# Patient Record
Sex: FEMALE | Race: WHITE
[De-identification: names, ages, dates, MRNs, and addresses within clinical notes are randomized per-mention and may not be internally consistent; named-entity substitution may affect disease eponyms.]

---

## 2019-07-10 ENCOUNTER — HOSPITAL ENCOUNTER (OUTPATIENT)
Dept: HOSPITAL 92 - ERS | Age: 54
Setting detail: OBSERVATION
LOS: 3 days | Discharge: HOME | End: 2019-07-13
Attending: HOSPITALIST | Admitting: HOSPITALIST
Payer: COMMERCIAL

## 2019-07-10 VITALS — BODY MASS INDEX: 38.6 KG/M2

## 2019-07-10 DIAGNOSIS — Z98.84: ICD-10-CM

## 2019-07-10 DIAGNOSIS — Z79.899: ICD-10-CM

## 2019-07-10 DIAGNOSIS — E66.9: ICD-10-CM

## 2019-07-10 DIAGNOSIS — E11.9: ICD-10-CM

## 2019-07-10 DIAGNOSIS — F32.9: ICD-10-CM

## 2019-07-10 DIAGNOSIS — Z79.4: ICD-10-CM

## 2019-07-10 DIAGNOSIS — K80.10: Primary | ICD-10-CM

## 2019-07-10 DIAGNOSIS — I10: ICD-10-CM

## 2019-07-10 DIAGNOSIS — R74.8: ICD-10-CM

## 2019-07-10 LAB
ALBUMIN SERPL BCG-MCNC: 4.1 G/DL (ref 3.5–5)
ALP SERPL-CCNC: 108 U/L (ref 40–150)
ALT SERPL W P-5'-P-CCNC: 89 U/L (ref 8–55)
ANION GAP SERPL CALC-SCNC: 15 MMOL/L (ref 10–20)
AST SERPL-CCNC: 214 U/L (ref 5–34)
BACTERIA UR QL AUTO: (no result) HPF
BASOPHILS # BLD AUTO: 0 THOU/UL (ref 0–0.2)
BASOPHILS NFR BLD AUTO: 0.3 % (ref 0–1)
BILIRUB SERPL-MCNC: 0.8 MG/DL (ref 0.2–1.2)
BUN SERPL-MCNC: 32 MG/DL (ref 9.8–20.1)
CALCIUM SERPL-MCNC: 10 MG/DL (ref 7.8–10.44)
CHLORIDE SERPL-SCNC: 105 MMOL/L (ref 98–107)
CK SERPL-CCNC: 94 U/L (ref 29–168)
CO2 SERPL-SCNC: 23 MMOL/L (ref 22–29)
CREAT CL PREDICTED SERPL C-G-VRATE: 0 ML/MIN (ref 70–130)
EOSINOPHIL # BLD AUTO: 0 THOU/UL (ref 0–0.7)
EOSINOPHIL NFR BLD AUTO: 0.2 % (ref 0–10)
GLOBULIN SER CALC-MCNC: 3.2 G/DL (ref 2.4–3.5)
GLUCOSE SERPL-MCNC: 304 MG/DL (ref 70–105)
HGB BLD-MCNC: 11.6 G/DL (ref 12–16)
LYMPHOCYTES # BLD: 0.8 THOU/UL (ref 1.2–3.4)
LYMPHOCYTES NFR BLD AUTO: 8.3 % (ref 21–51)
MCH RBC QN AUTO: 26 PG (ref 27–31)
MCV RBC AUTO: 83.1 FL (ref 78–98)
MDIFF COMPLETE?: YES
MONOCYTES # BLD AUTO: 0.3 THOU/UL (ref 0.11–0.59)
MONOCYTES NFR BLD AUTO: 3.3 % (ref 0–10)
NEUTROPHILS # BLD AUTO: 8.3 THOU/UL (ref 1.4–6.5)
NEUTROPHILS NFR BLD AUTO: 87.9 % (ref 42–75)
OVALOCYTES BLD QL SMEAR: (no result) (100X)
PLATELET # BLD AUTO: 166 THOU/UL (ref 130–400)
POLYCHROMASIA BLD QL SMEAR: (no result) (100X)
POTASSIUM SERPL-SCNC: 4.5 MMOL/L (ref 3.5–5.1)
PROT UR STRIP.AUTO-MCNC: 20 MG/DL
RBC # BLD AUTO: 4.46 MILL/UL (ref 4.2–5.4)
RBC UR QL AUTO: (no result) HPF (ref 0–3)
SODIUM SERPL-SCNC: 138 MMOL/L (ref 136–145)
TROPONIN I SERPL DL<=0.01 NG/ML-MCNC: (no result) NG/ML (ref ?–0.03)
TROPONIN I SERPL DL<=0.01 NG/ML-MCNC: (no result) NG/ML (ref ?–0.03)
WBC # BLD AUTO: 9.5 THOU/UL (ref 4.8–10.8)
WBC UR QL AUTO: (no result) HPF (ref 0–3)

## 2019-07-10 PROCEDURE — 47532 INJECTION FOR CHOLANGIOGRAM: CPT

## 2019-07-10 PROCEDURE — 80053 COMPREHEN METABOLIC PANEL: CPT

## 2019-07-10 PROCEDURE — 76705 ECHO EXAM OF ABDOMEN: CPT

## 2019-07-10 PROCEDURE — 88304 TISSUE EXAM BY PATHOLOGIST: CPT

## 2019-07-10 PROCEDURE — 96372 THER/PROPH/DIAG INJ SC/IM: CPT

## 2019-07-10 PROCEDURE — 82550 ASSAY OF CK (CPK): CPT

## 2019-07-10 PROCEDURE — 74018 RADEX ABDOMEN 1 VIEW: CPT

## 2019-07-10 PROCEDURE — 83690 ASSAY OF LIPASE: CPT

## 2019-07-10 PROCEDURE — 36416 COLLJ CAPILLARY BLOOD SPEC: CPT

## 2019-07-10 PROCEDURE — 36415 COLL VENOUS BLD VENIPUNCTURE: CPT

## 2019-07-10 PROCEDURE — A9500 TC99M SESTAMIBI: HCPCS

## 2019-07-10 PROCEDURE — 96361 HYDRATE IV INFUSION ADD-ON: CPT

## 2019-07-10 PROCEDURE — 93010 ELECTROCARDIOGRAM REPORT: CPT

## 2019-07-10 PROCEDURE — 85025 COMPLETE CBC W/AUTO DIFF WBC: CPT

## 2019-07-10 PROCEDURE — 85730 THROMBOPLASTIN TIME PARTIAL: CPT

## 2019-07-10 PROCEDURE — 96374 THER/PROPH/DIAG INJ IV PUSH: CPT

## 2019-07-10 PROCEDURE — 71045 X-RAY EXAM CHEST 1 VIEW: CPT

## 2019-07-10 PROCEDURE — G0378 HOSPITAL OBSERVATION PER HR: HCPCS

## 2019-07-10 PROCEDURE — 78452 HT MUSCLE IMAGE SPECT MULT: CPT

## 2019-07-10 PROCEDURE — C9113 INJ PANTOPRAZOLE SODIUM, VIA: HCPCS

## 2019-07-10 PROCEDURE — 83880 ASSAY OF NATRIURETIC PEPTIDE: CPT

## 2019-07-10 PROCEDURE — 93017 CV STRESS TEST TRACING ONLY: CPT

## 2019-07-10 PROCEDURE — 81001 URINALYSIS AUTO W/SCOPE: CPT

## 2019-07-10 PROCEDURE — 93005 ELECTROCARDIOGRAM TRACING: CPT

## 2019-07-10 PROCEDURE — 85379 FIBRIN DEGRADATION QUANT: CPT

## 2019-07-10 PROCEDURE — 96376 TX/PRO/DX INJ SAME DRUG ADON: CPT

## 2019-07-10 PROCEDURE — S0028 INJECTION, FAMOTIDINE, 20 MG: HCPCS

## 2019-07-10 PROCEDURE — 85610 PROTHROMBIN TIME: CPT

## 2019-07-10 PROCEDURE — 96375 TX/PRO/DX INJ NEW DRUG ADDON: CPT

## 2019-07-10 PROCEDURE — 84484 ASSAY OF TROPONIN QUANT: CPT

## 2019-07-10 PROCEDURE — 96365 THER/PROPH/DIAG IV INF INIT: CPT

## 2019-07-10 NOTE — RAD
SUPINE ABDOMEN:

7/10/19

 

HISTORY: 

Abdominal pain, constipation. 

 

Bowel gas patter unremarkable. Scattered stool and gas throughout the colon. Post cholecystectomy cli
ps. Small bowel gas patter is unremarkable. No mass effect. [place] shaped density overlying the left 
colon may represent ingested tablet. 

 

IMPRESSION: 

Unremarkable bowel gas pattern. 

 

POS: [facility]

## 2019-07-10 NOTE — RAD
Chest one view



HISTORY: Chest pain.



FINDINGS: No comparison. Cardiac silhouette is magnified by projection. Pulmonary vasculature is unre
markable. Mediastinum is midline. No confluent airspace consolidation or evidence of pneumothorax.







IMPRESSION: No active cardiopulmonary abnormalities are demonstrated.



Reported By: DAVID Ramirez 

Electronically Signed:  7/10/2019 7:41 AM

## 2019-07-10 NOTE — HP
CHIEF COMPLAINT:  Chest pain.



HISTORY OF PRESENT ILLNESS:  Ms. Chavez is a very pleasant 53-year-old woman, who

presents with complaints of chest pain that woke her from her sleep at approximately

2:00 this morning.  The patient describes a pain in the lower sternal/epigastric

region and states it felt like a pressure similar to what she has experienced in the

past.  The patient has a history of a gastric sleeve and feels she has had episodes

where food gets stuck and her pain is unrelieved with vomiting.  The gastric sleeve

was placed 7 years ago.  The patient states the discomfort she felt today was

different in that she did not vomit.  She states the discomfort wrapped around both

sides of her abdomen and she found it difficult to take a deep breath due to the

pressure felt in her upper abdomen.  She did not try taking anything for this at

home and due to concerns for cardiac problem, she opted to come in to the emergency

department.  The patient states in the last 2 to 3 months, she has made changes with

her diet and has become more conscious of maintaining a low sugar, low-carbohydrate

diet as she used to do after initially undergoing the gastric sleeve.  The patient

states since then, she has noted changes with her glucose, which have many times

been in the 50s.  She also reports noting a drop in her blood pressure, which has

been in the 90s systolic.  The patient sought to obtain followup with her primary

care physician for both of these issues.  She was advised to adjust her diabetes

medications on her own until due for the 3-month hemoglobin A1c check on .

The patient therefore opted to discontinue her Lantus and drop her

glyburide/metformin 5/500 mg dose to one tablet daily instead of 2 tablets daily.

She has taken her insulin on occasion if her glucose has been in the 200s due to

what she is eating.  Otherwise, she has been very strict with her diet. 



The patient denies experiencing any diaphoresis with the chest pain.  Denies having

any shortness of breath or cough.  No hemoptysis.  Denies having any fevers.  She

does report having chills down in the emergency department.  Reports having issues

with constipation.  Denies having any dysuria or hematuria.  All other review of

systems are negative. 



PAST MEDICAL HISTORY:  

1. Diabetes mellitus.

2. Hypertension.

3. Obesity.

4. Depression.



PAST SURGICAL HISTORY:  

1. .

2. Hand surgery.

3. Gastric sleeve in 2012.



SOCIAL HISTORY:  The patient denies any alcohol intake.  Denies any drug use or

tobacco use, though she did smoke until 1 year ago. 



ALLERGIES:  NO KNOWN DRUG ALLERGIES.



CURRENT MEDICATIONS:  

1. Lantus.

2. Lisinopril.

3. Simvastatin.

4. Glyburide/metformin.

5. Oxybutynin.

6. Prozac.



PHYSICAL EXAMINATION:

GENERAL:  The patient appears overweight, well developed, in no acute distress. 

VITAL SIGNS:  Temperature 97.4, pulse 63, blood pressure 101/68, respirations 16, O2

saturation 97% on room air. 

HEENT:  Normocephalic and atraumatic.  Pupils are equal, round, reactive to light.

Sclerae icterus.  Oropharynx is clear. 

NECK:  Supple. 

LUNGS:  Clear to auscultation bilaterally. 

CARDIAC:  Regular rate and rhythm.  No chest wall tenderness. 

ABDOMEN:  Soft, nontender, nondistended.  Normoactive bowel sounds present.  No

Diaz sign.  No renal angle tenderness.  No guarding or rigidity. 

EXTREMITIES:  Without lower leg swelling or edema. 

NEUROLOGIC:  Alert and oriented x3. 

SKIN:  Without rash or jaundice.



LABORATORY STUDIES:  EKG showed normal sinus rhythm without any ST changes or T-wave

abnormalities. 



White count 9.5, hemoglobin 11.6, hematocrit 37, platelets 166.  D-dimer 0.36.

Electrolytes unremarkable.  BUN 32, creatinine 1.06, GFR 54.  No previous renal

functions to compare to.  Glucose 304, calcium 10, total bilirubin 0.8, AST elevated

at 214 and ALT 89.  Lipase 49.  Troponin negative x3.  CK 94, and alkaline

phosphatase 108. 



IMAGING DATA:  Chest x-ray showed no active cardiopulmonary abnormalities.



IMPRESSION AND PLAN:  Ms. Chavez is a 53-year-old woman, who presented with lower

sternal/epigastric pain, referred for management of the following. 

1. Acute coronary syndrome rule out.  Troponins negative x3.  An EKG without any

changes.  Pain appears to be more abdominal in nature, likely associated with her

gastric sleeve or gallbladder problem given the elevated LFTs.  No pain at present.

Unlikely to be cardiac in nature, but we will continue to trend troponins and

monitor.  We will obtain a stress test given her risk factors.  We have also

requested a gallbladder ultrasound and abdominal x-ray.  We will continue to trend

her LFTs. 

2. Constipation.  We will manage with stool softeners.

3. Hypertension.  We will resume her home medications, but we will monitor her blood

pressures and administer accordingly. 

4. Diabetes mellitus.  We will reconcile her home medications except Lantus.  We

will initiate insulin sliding scale.  Metformin and glyburide will be held for now

given stress test. 

5. Gastrointestinal prophylaxis with Protonix.

6. Deep venous thrombosis prophylaxis with mechanical SCDs.

7. Code status full.  Her surrogate decision maker is her , Kahlil Walton.

8. The patient's case was discussed with Dr. Darden, who agrees with plan of care

as described above. 







Job ID:  [id number]

## 2019-07-10 NOTE — ULT
RIGHT UPPER QUADRANT ABDOMINAL ULTRASOUND:

7/10/19

 

COMPARISON: 

None.

 

HISTORY: 

Right sided abdominal pain and elevated LFTs. 

 

TECHNIQUE:  

Multiplanar gray scale and color Doppler images were obtained in a right upper quadrant abdominal ult
rasound. 

 

FINDINGS: 

The liver is normal in echogenicity without focal lesions or intrahepatic ductal dilatation. Multiple
 shadowing stones are seen in the gallbladder. There is no gallbladder wall thickening or pericholecy
stic fluid. The common bile duct is normal measuring 1 to 2 mm. 

 

The visualized portions of the pancreas are unremarkable.  The right kidney is normal in echogenicity
 without hydronephrosis or calculus and measures  10.2 cm in length. 

 

IMPRESSION: 

Cholelithiasis. 

 

POS: [facility]

## 2019-07-11 LAB
ALBUMIN SERPL BCG-MCNC: 3.6 G/DL (ref 3.5–5)
ALP SERPL-CCNC: 177 U/L (ref 40–150)
ALT SERPL W P-5'-P-CCNC: 459 U/L (ref 8–55)
ANION GAP SERPL CALC-SCNC: 9 MMOL/L (ref 10–20)
AST SERPL-CCNC: 456 U/L (ref 5–34)
BASOPHILS # BLD AUTO: 0 THOU/UL (ref 0–0.2)
BASOPHILS NFR BLD AUTO: 0.2 % (ref 0–1)
BILIRUB SERPL-MCNC: 1.6 MG/DL (ref 0.2–1.2)
BUN SERPL-MCNC: 21 MG/DL (ref 9.8–20.1)
CALCIUM SERPL-MCNC: 9.6 MG/DL (ref 7.8–10.44)
CHLORIDE SERPL-SCNC: 108 MMOL/L (ref 98–107)
CO2 SERPL-SCNC: 28 MMOL/L (ref 22–29)
CREAT CL PREDICTED SERPL C-G-VRATE: 103 ML/MIN (ref 70–130)
EOSINOPHIL # BLD AUTO: 0.2 THOU/UL (ref 0–0.7)
EOSINOPHIL NFR BLD AUTO: 3.2 % (ref 0–10)
GLOBULIN SER CALC-MCNC: 3 G/DL (ref 2.4–3.5)
GLUCOSE SERPL-MCNC: 175 MG/DL (ref 70–105)
HGB BLD-MCNC: 10.8 G/DL (ref 12–16)
LIPASE SERPL-CCNC: 27 U/L (ref 8–78)
LYMPHOCYTES # BLD: 1.3 THOU/UL (ref 1.2–3.4)
LYMPHOCYTES NFR BLD AUTO: 20.4 % (ref 21–51)
MCH RBC QN AUTO: 26.4 PG (ref 27–31)
MCV RBC AUTO: 84.4 FL (ref 78–98)
MONOCYTES # BLD AUTO: 0.5 THOU/UL (ref 0.11–0.59)
MONOCYTES NFR BLD AUTO: 7.3 % (ref 0–10)
NEUTROPHILS # BLD AUTO: 4.5 THOU/UL (ref 1.4–6.5)
NEUTROPHILS NFR BLD AUTO: 68.9 % (ref 42–75)
PLATELET # BLD AUTO: 153 THOU/UL (ref 130–400)
POTASSIUM SERPL-SCNC: 4.1 MMOL/L (ref 3.5–5.1)
RBC # BLD AUTO: 4.11 MILL/UL (ref 4.2–5.4)
SODIUM SERPL-SCNC: 141 MMOL/L (ref 136–145)
WBC # BLD AUTO: 6.6 THOU/UL (ref 4.8–10.8)

## 2019-07-11 RX ADMIN — INSULIN LISPRO PRN UNIT: 100 INJECTION, SOLUTION INTRAVENOUS; SUBCUTANEOUS at 17:43

## 2019-07-11 NOTE — PDOC.PN
- Subjective


Encounter Start Date: 07/11/19


Encounter Start Time: 07:57


Subjective: Patient states she feels well and without any complaints.


-: No further abdominal pain, n/v. has not had anything to eat


-: due to stress test scheduled for today.  No fevers, chills or sweats. 


Denies any chest pain or sob. No lightheadedness/dizziness. 





- Objective


Resuscitation Status - Order Detail:





07/10/19 13:18


Resuscitation Status Routine 


   Co-Sign Provider: 


   Resuscitation Status: FULL: Full Resuscitation








Vital Signs & Weight: 


 Vital Signs (12 hours)











  Temp Pulse Resp BP BP Pulse Ox


 


 07/11/19 11:38  97.9 F  60  15   143/64 H  99


 


 07/11/19 08:00  97.9 F  68  16   130/61  96


 


 07/11/19 05:15   69  18  115/54 L   95








 Weight











Weight                         225 lb














I&O: 


 











 07/10/19 07/11/19 07/12/19





 06:59 06:59 06:59


 


Intake Total  240 


 


Output Total  100 


 


Balance  140 











Result Diagrams: 


 07/11/19 05:43





 07/11/19 05:43


Additional Labs: 


 Accuchecks











  07/10/19 07/10/19





  20:45 15:58


 


POC Glucose  201 H  221 H














Phys Exam





- Physical Examination


Constitutional: NAD


HEENT: PERRLA, moist MMs, oral pharynx no lesions


Neck: no nodes, supple, full ROM


Respiratory: clear to auscultation bilateral


Cardiovascular: RRR


Gastrointestinal: soft, non-tender, no distention, positive bowel sounds


Musculoskeletal: no edema, pulses present


Neurological: normal sensation, moves all 4 limbs


Psychiatric: normal affect, A&O x 3


Skin: no rash





Dx/Plan


(1) Elevated liver enzymes


Code(s): R74.8 - ABNORMAL LEVELS OF OTHER SERUM ENZYMES   Status: Acute   





(2) Cholelithiasis


Code(s): K80.20 - CALCULUS OF GALLBLADDER W/O CHOLECYSTITIS W/O OBSTRUCTION   

Status: Acute   





(3) Diabetes


Code(s): E11.9 - TYPE 2 DIABETES MELLITUS WITHOUT COMPLICATIONS   Status: 

Chronic   


Qualifiers: 


   Diabetes mellitus type: type 2 





(4) Hypertension


Code(s): I10 - ESSENTIAL (PRIMARY) HYPERTENSION   Status: Chronic   





- Plan


cont current plan of care, DVT proph w/SCDs


LFTs further elevated and now with hyperbilirubinemia.


-: Gallstones confirmed on GB US yesterday but at that time no obstruction.


-: Consult placed to Dr. Penaloza (Her surgeon who performed her gastric sleeve).


-: Patient for stress test, remain NPO after stress test, til surgical review





* .

## 2019-07-11 NOTE — CON
DATE OF CONSULTATION:  



CHIEF COMPLAINT:  Severe epigastric pain.



HISTORY OF PRESENT ILLNESS:  A 53-year-old female, who was admitted to the hospital

with an episode of severe epigastric pain associated with nausea, radiating to the

back.  She has had several smaller episodes of this.  Ultrasound shows

cholelithiasis. 



PAST MEDICAL HISTORY:  Diabetes, hypertension, obesity, depression.



PAST SURGICAL HISTORY:   section, hand surgery, sleeve gastrectomy in 2012.



MEDICATIONS:  Lantus, lisinopril, simvastatin, glyburide, oxybutynin, and Prozac.



ALLERGIES:  NO KNOWN DRUG ALLERGIES.



SOCIAL HISTORY:  No tobacco or alcohol.



PHYSICAL EXAMINATION:

VITAL SIGNS:  Temperature 97.9, pulse 60, blood pressure 143/64. 

GENERAL:  Obese female, in no apparent distress. 

HEENT:  No jaundice. 

LUNGS:  Clear. 

HEART:  Regular rate and rhythm. 

ABDOMEN:  Obese, soft.  Mild tenderness in mid epigastrium and right upper quadrant. 

EXTREMITIES:  Unremarkable.



LABORATORY DATA:  White count 6.6, H and H 10 and 34, platelet count 153.  She has

an elevated bilirubin at 1.6, AST at 456, ALT at 459, alkaline phosphatase 177,

lipase is 27. 



ASSESSMENT:  Severe biliary colic with elevated liver functions.



PLAN:  Laparoscopic cholecystectomy with cholangiogram.



CONSENT:  Discussed planned procedure as well as risk of bleeding, infection, injury

to bowel and bladder, need to open.  She understands and gives informed consent. 







Job ID:  [id number]

## 2019-07-11 NOTE — NM
MYOCARDIAL PERFUSION SCAN:

 

INDICATIONS:

Chest pain.

 

TECHNIQUE:

A stress only exam was performed, administering 30 millicuries of technetium sestamibi.

 

FINDINGS:

The left ventricle shows normal activity on the stress only images.  No evidence of defect or ischemi
a.

 

Wall motion appears normal with ejection fraction recorded at 60%.

 

IMPRESSION:

Negative stress only myocardial perfusion examination.

 

POS: [facility]

## 2019-07-12 LAB
ALBUMIN SERPL BCG-MCNC: 3.5 G/DL (ref 3.5–5)
ALP SERPL-CCNC: 173 U/L (ref 40–150)
ALT SERPL W P-5'-P-CCNC: 272 U/L (ref 8–55)
ANION GAP SERPL CALC-SCNC: 11 MMOL/L (ref 10–20)
APTT PPP: 36.6 SEC (ref 22.9–36.1)
AST SERPL-CCNC: 114 U/L (ref 5–34)
BASOPHILS # BLD AUTO: 0 THOU/UL (ref 0–0.2)
BASOPHILS NFR BLD AUTO: 0.4 % (ref 0–1)
BILIRUB SERPL-MCNC: 0.5 MG/DL (ref 0.2–1.2)
BUN SERPL-MCNC: 17 MG/DL (ref 9.8–20.1)
CALCIUM SERPL-MCNC: 9.3 MG/DL (ref 7.8–10.44)
CHLORIDE SERPL-SCNC: 107 MMOL/L (ref 98–107)
CO2 SERPL-SCNC: 25 MMOL/L (ref 22–29)
CREAT CL PREDICTED SERPL C-G-VRATE: 102 ML/MIN (ref 70–130)
EOSINOPHIL # BLD AUTO: 0.1 THOU/UL (ref 0–0.7)
EOSINOPHIL NFR BLD AUTO: 1.9 % (ref 0–10)
GLOBULIN SER CALC-MCNC: 3 G/DL (ref 2.4–3.5)
GLUCOSE SERPL-MCNC: 198 MG/DL (ref 70–105)
HGB BLD-MCNC: 10.5 G/DL (ref 12–16)
INR PPP: 1
LIPASE SERPL-CCNC: 23 U/L (ref 8–78)
LYMPHOCYTES # BLD: 1.6 THOU/UL (ref 1.2–3.4)
LYMPHOCYTES NFR BLD AUTO: 21.5 % (ref 21–51)
MCH RBC QN AUTO: 26.2 PG (ref 27–31)
MCV RBC AUTO: 84 FL (ref 78–98)
MONOCYTES # BLD AUTO: 0.4 THOU/UL (ref 0.11–0.59)
MONOCYTES NFR BLD AUTO: 5.9 % (ref 0–10)
NEUTROPHILS # BLD AUTO: 5.2 THOU/UL (ref 1.4–6.5)
NEUTROPHILS NFR BLD AUTO: 70.4 % (ref 42–75)
PLATELET # BLD AUTO: 159 THOU/UL (ref 130–400)
POTASSIUM SERPL-SCNC: 4 MMOL/L (ref 3.5–5.1)
PROTHROMBIN TIME: 13.1 SEC (ref 12–14.7)
RBC # BLD AUTO: 4 MILL/UL (ref 4.2–5.4)
SODIUM SERPL-SCNC: 139 MMOL/L (ref 136–145)
WBC # BLD AUTO: 7.3 THOU/UL (ref 4.8–10.8)

## 2019-07-12 PROCEDURE — BF121ZZ FLUOROSCOPY OF GALLBLADDER USING LOW OSMOLAR CONTRAST: ICD-10-PCS | Performed by: SURGERY

## 2019-07-12 PROCEDURE — 0FT44ZZ RESECTION OF GALLBLADDER, PERCUTANEOUS ENDOSCOPIC APPROACH: ICD-10-PCS | Performed by: SURGERY

## 2019-07-12 RX ADMIN — CEFOXITIN SODIUM SCH MLS: 2 INJECTION, SOLUTION INTRAVENOUS at 17:54

## 2019-07-12 RX ADMIN — FAMOTIDINE SCH: 10 INJECTION, SOLUTION INTRAVENOUS at 20:55

## 2019-07-12 RX ADMIN — INSULIN LISPRO PRN UNIT: 100 INJECTION, SOLUTION INTRAVENOUS; SUBCUTANEOUS at 17:56

## 2019-07-12 RX ADMIN — HYDROCODONE BITARTRATE AND ACETAMINOPHEN PRN TAB: 10; 325 TABLET ORAL at 16:18

## 2019-07-12 NOTE — OP
DATE OF PROCEDURE:  07/12/2019



PREOPERATIVE DIAGNOSIS:  Symptomatic cholelithiasis with elevated liver function.



PROCEDURE PERFORMED:  Laparoscopic cholecystectomy with cholangiogram.



INDICATIONS:  A 53-year-old female, who has been having severe epigastric pain

radiating to back.  She had elevated liver function tests.  Negative heart workup.

Ultrasound showed stones. 



FINDINGS:  Multiple stones in the gallbladder.  Cholangiogram was normal.



DESCRIPTION OF PROCEDURE:  After informed consent was obtained, the patient was

taken to the operating room, given general endotracheal anesthesia, placed in supine

position.  Abdomen was prepped and draped in usual fashion.  Local anesthesia was

infiltrated subcutaneously and deep subumbilical incision was performed.  Subcu

divided sharply.  The fascia was grasped with 2 stay sutures of 0 Vicryl, placed in

each side of midline.  Midline incised.  Digital palpation revealed no local

adhesions.  A blunt 12 mm trocar inserted.  Pneumoperitoneum was created to a

pressure of 15 mmHg.  Three 5 mm ports were placed subcostally.  Gallbladder

grasped, advanced superiorly.  The peritoneum lysed distally to expose the cystic

duct artery in critical view.  A clip was placed at the base of the gallbladder on

the cystic duct.  An incision was made in the cystic duct.  An Arrow

cholangiocatheter inserted.  Intraoperative cholangiogram was performed utilizing

fluoroscopy.  It showed free flow in the duodenum.  No filling defects.  Normal

hepatic ducts.  The duct was triply ligated with hemoclips and divided the artery.

Triply ligated with hemoclips and divided the gallbladder, removed from its fossa

utilizing electrocautery, removed from the abdomen through the umbilical port.

Hemostasis was assured.  Trocars and retractors removed.  Fascia closed with

interrupted 0 Vicryl suture.  The skin closed with interrupted 4-0 Rapide.

Dermabond applied.  The patient tolerated the procedure well, transferred to

Recovery in good condition.  Sponge and needle count verified correct x2. 







Job ID:  [id number]

## 2019-07-12 NOTE — RAD
XR Cholangiogram in Surgery



HISTORY: Intraoperative film



COMPARISON: None.



FINDINGS: 2 films are presented for interpretation of these show filling of a nondilated common bile 
duct with emptying into the duodenum. No filling defects seen.



IMPRESSION: Unremarkable operative cholangiogram.



Reported By: Bennie Lim 

Electronically Signed:  7/12/2019 11:37 AM

## 2019-07-12 NOTE — PDOC.PN
- Subjective


Encounter Start Date: 07/12/19


Encounter Start Time: 07:52


Subjective: Patient states she is feeling great and without any complaints. 


-: Denies any chest pain or sob. Has not had any abdominal pain.


-: No fevers or chills. Resting comfortably and awaiting surgery this morning.





- Objective


Resuscitation Status - Order Detail:





07/10/19 13:18


Resuscitation Status Routine 


   Co-Sign Provider: 


   Resuscitation Status: FULL: Full Resuscitation








Vital Signs & Weight: 


 Vital Signs (12 hours)











  Temp Pulse Resp BP BP Pulse Ox


 


 07/12/19 12:58     135/63  


 


 07/12/19 12:30  97.8 F  83  18   135/63  95


 


 07/12/19 07:46  98.1 F  62  18   147/68 H  95


 


 07/12/19 06:22   69  18  139/59 L  








 Weight











Weight                         225 lb














I&O: 


 











 07/11/19 07/12/19 07/13/19





 06:59 06:59 06:59


 


Intake Total 240 1327 


 


Output Total 100  


 


Balance 140 1327 











Result Diagrams: 


 07/12/19 08:12





 07/12/19 08:12


Additional Labs: 


 Accuchecks











  07/12/19 07/11/19 07/11/19





  06:21 20:49 16:47


 


POC Glucose  202 H  194 H  225 H














  07/11/19





  15:15


 


POC Glucose  249 H














Phys Exam





- Physical Examination


Constitutional: NAD


HEENT: PERRLA, moist MMs, sclera anicteric


Neck: no nodes, supple, full ROM


Respiratory: no wheezing, no rales, no rhonchi, clear to auscultation bilateral


Cardiovascular: RRR


Gastrointestinal: soft, non-tender, no distention, positive bowel sounds


Musculoskeletal: no edema, pulses present


Neurological: normal sensation, moves all 4 limbs


Psychiatric: normal affect, A&O x 3


Skin: no rash





Dx/Plan


(1) Elevated liver enzymes


Code(s): R74.8 - ABNORMAL LEVELS OF OTHER SERUM ENZYMES   Status: Acute   


Plan: 


 








(2) Cholelithiasis


Code(s): K80.20 - CALCULUS OF GALLBLADDER W/O CHOLECYSTITIS W/O OBSTRUCTION   

Status: Acute   





(3) Diabetes


Code(s): E11.9 - TYPE 2 DIABETES MELLITUS WITHOUT COMPLICATIONS   Status: 

Chronic   


Qualifiers: 


   Diabetes mellitus type: type 2 





(4) Hypertension


Code(s): I10 - ESSENTIAL (PRIMARY) HYPERTENSION   Status: Chronic   





- Plan


cont current plan of care


LFTs now trending down. Patient asymptomatic.


-: For warren landrume this am. Possible discharge later today if no problems. 


-: ADDENDUM: patient returned from surgery, apparently had an episode of


-: chest pain, EKG done and normal. Trop negative. Will continue to trend. 


-: Believed to be associated with air, causing pain up chest into Lt shoulder.


She became hypertensive in 160s, and was treated with Labetalol. 


She was also given Fentanyl for pain. 





Patient feeling well at present, no pain but feels constipated. 


Clear liquid diet to be advanced as tolerated. Will give Miralax.

## 2019-07-13 VITALS — TEMPERATURE: 97.3 F | DIASTOLIC BLOOD PRESSURE: 58 MMHG | SYSTOLIC BLOOD PRESSURE: 148 MMHG

## 2019-07-13 LAB
ALBUMIN SERPL BCG-MCNC: 3.4 G/DL (ref 3.5–5)
ALP SERPL-CCNC: 141 U/L (ref 40–150)
ALT SERPL W P-5'-P-CCNC: 201 U/L (ref 8–55)
ANION GAP SERPL CALC-SCNC: 12 MMOL/L (ref 10–20)
AST SERPL-CCNC: 60 U/L (ref 5–34)
BASOPHILS # BLD AUTO: 0 THOU/UL (ref 0–0.2)
BASOPHILS NFR BLD AUTO: 0.1 % (ref 0–1)
BILIRUB SERPL-MCNC: 0.4 MG/DL (ref 0.2–1.2)
BUN SERPL-MCNC: 22 MG/DL (ref 9.8–20.1)
CALCIUM SERPL-MCNC: 8.9 MG/DL (ref 7.8–10.44)
CHLORIDE SERPL-SCNC: 104 MMOL/L (ref 98–107)
CO2 SERPL-SCNC: 23 MMOL/L (ref 22–29)
CREAT CL PREDICTED SERPL C-G-VRATE: 85 ML/MIN (ref 70–130)
EOSINOPHIL # BLD AUTO: 0 THOU/UL (ref 0–0.7)
EOSINOPHIL NFR BLD AUTO: 0.3 % (ref 0–10)
GLOBULIN SER CALC-MCNC: 2.8 G/DL (ref 2.4–3.5)
GLUCOSE SERPL-MCNC: 295 MG/DL (ref 70–105)
HGB BLD-MCNC: 9.4 G/DL (ref 12–16)
LYMPHOCYTES # BLD: 1.7 THOU/UL (ref 1.2–3.4)
LYMPHOCYTES NFR BLD AUTO: 19.4 % (ref 21–51)
MCH RBC QN AUTO: 25.8 PG (ref 27–31)
MCV RBC AUTO: 84.4 FL (ref 78–98)
MONOCYTES # BLD AUTO: 0.5 THOU/UL (ref 0.11–0.59)
MONOCYTES NFR BLD AUTO: 5.4 % (ref 0–10)
NEUTROPHILS # BLD AUTO: 6.4 THOU/UL (ref 1.4–6.5)
NEUTROPHILS NFR BLD AUTO: 74.7 % (ref 42–75)
PLATELET # BLD AUTO: 143 THOU/UL (ref 130–400)
POTASSIUM SERPL-SCNC: 4.4 MMOL/L (ref 3.5–5.1)
RBC # BLD AUTO: 3.64 MILL/UL (ref 4.2–5.4)
SODIUM SERPL-SCNC: 135 MMOL/L (ref 136–145)
WBC # BLD AUTO: 8.5 THOU/UL (ref 4.8–10.8)

## 2019-07-13 RX ADMIN — FAMOTIDINE SCH: 10 INJECTION, SOLUTION INTRAVENOUS at 09:39

## 2019-07-13 RX ADMIN — CEFOXITIN SODIUM SCH MLS: 2 INJECTION, SOLUTION INTRAVENOUS at 02:01

## 2019-07-13 RX ADMIN — HYDROCODONE BITARTRATE AND ACETAMINOPHEN PRN TAB: 10; 325 TABLET ORAL at 09:51

## 2019-07-13 RX ADMIN — INSULIN LISPRO PRN UNIT: 100 INJECTION, SOLUTION INTRAVENOUS; SUBCUTANEOUS at 12:10

## 2019-07-13 RX ADMIN — CEFOXITIN SODIUM SCH MLS: 2 INJECTION, SOLUTION INTRAVENOUS at 09:51

## 2019-07-13 RX ADMIN — INSULIN LISPRO PRN UNIT: 100 INJECTION, SOLUTION INTRAVENOUS; SUBCUTANEOUS at 05:56

## 2019-07-13 NOTE — EKG
Test Reason : CP

Blood Pressure : ***/*** mmHG

Vent. Rate : 061 BPM     Atrial Rate : 061 BPM

   P-R Int : 152 ms          QRS Dur : 076 ms

    QT Int : 442 ms       P-R-T Axes : -02 -08 003 degrees

   QTc Int : 444 ms

 

Normal sinus rhythm

Low voltage QRS

Confirmed by LIBERTY STERLING ([id number]),  DEBRA GROSS ([id number]) on 7/13/2019 12:14:47 PM

 

Referred By:             Confirmed By:LIBERTY STERLING

## 2019-07-15 NOTE — EKG
Test Reason : C/O CHEST PAIN

Blood Pressure : ***/*** mmHG

Vent. Rate : 097 BPM     Atrial Rate : 097 BPM

   P-R Int : 172 ms          QRS Dur : 076 ms

    QT Int : 372 ms       P-R-T Axes : 062 016 013 degrees

   QTc Int : 472 ms

 

Normal sinus rhythm

Low voltage QRS

Nonspecific ST abnormality

Abnormal ECG

Confirmed by AMITA ISAACS ([id number]) on 7/15/2019 4:47:57 PM

 

Referred By:             Confirmed By:AMITA ISAACS